# Patient Record
Sex: MALE
[De-identification: names, ages, dates, MRNs, and addresses within clinical notes are randomized per-mention and may not be internally consistent; named-entity substitution may affect disease eponyms.]

---

## 2018-03-22 PROBLEM — Z00.00 ENCOUNTER FOR PREVENTIVE HEALTH EXAMINATION: Status: ACTIVE | Noted: 2018-03-22

## 2018-04-19 ENCOUNTER — FORM ENCOUNTER (OUTPATIENT)
Age: 63
End: 2018-04-19

## 2018-04-20 ENCOUNTER — APPOINTMENT (OUTPATIENT)
Dept: ORTHOPEDIC SURGERY | Facility: CLINIC | Age: 63
End: 2018-04-20

## 2018-04-20 ENCOUNTER — OUTPATIENT (OUTPATIENT)
Dept: OUTPATIENT SERVICES | Facility: HOSPITAL | Age: 63
LOS: 1 days | End: 2018-04-20
Payer: COMMERCIAL

## 2018-04-20 ENCOUNTER — APPOINTMENT (OUTPATIENT)
Dept: ORTHOPEDIC SURGERY | Facility: CLINIC | Age: 63
End: 2018-04-20
Payer: COMMERCIAL

## 2018-04-20 VITALS — SYSTOLIC BLOOD PRESSURE: 142 MMHG | DIASTOLIC BLOOD PRESSURE: 88 MMHG

## 2018-04-20 VITALS — WEIGHT: 188 LBS | HEIGHT: 69 IN | BODY MASS INDEX: 27.85 KG/M2

## 2018-04-20 DIAGNOSIS — Z78.9 OTHER SPECIFIED HEALTH STATUS: ICD-10-CM

## 2018-04-20 DIAGNOSIS — M75.42 IMPINGEMENT SYNDROME OF LEFT SHOULDER: ICD-10-CM

## 2018-04-20 PROCEDURE — 73030 X-RAY EXAM OF SHOULDER: CPT

## 2018-04-20 PROCEDURE — 73030 X-RAY EXAM OF SHOULDER: CPT | Mod: 26,LT

## 2018-04-20 PROCEDURE — 99203 OFFICE O/P NEW LOW 30 MIN: CPT

## 2018-04-20 RX ORDER — PREDNISONE 10 MG/1
10 TABLET ORAL
Qty: 65 | Refills: 0 | Status: ACTIVE | COMMUNITY
Start: 2018-01-09

## 2018-04-20 RX ORDER — HYDROCODONE BITARTRATE AND HOMATROPINE METHYLBROMIDE 5; 1.5 MG/5ML; MG/5ML
5-1.5 SOLUTION ORAL
Qty: 120 | Refills: 0 | Status: ACTIVE | COMMUNITY
Start: 2018-01-09

## 2018-04-20 RX ORDER — ALBUTEROL SULFATE 90 UG/1
108 (90 BASE) AEROSOL, METERED RESPIRATORY (INHALATION)
Qty: 8 | Refills: 0 | Status: ACTIVE | COMMUNITY
Start: 2018-02-22

## 2018-04-20 RX ORDER — ROSUVASTATIN CALCIUM 10 MG/1
10 TABLET, FILM COATED ORAL
Refills: 0 | Status: ACTIVE | COMMUNITY

## 2018-04-20 RX ORDER — MUPIROCIN 20 MG/G
2 OINTMENT TOPICAL
Qty: 44 | Refills: 0 | Status: ACTIVE | COMMUNITY
Start: 2017-12-21

## 2018-04-20 RX ORDER — ROSUVASTATIN CALCIUM 10 MG/1
10 TABLET, FILM COATED ORAL
Qty: 30 | Refills: 0 | Status: ACTIVE | COMMUNITY
Start: 2017-11-03

## 2018-04-20 RX ORDER — LEVOCETIRIZINE DIHYDROCHLORIDE 5 MG/1
5 TABLET ORAL
Qty: 90 | Refills: 0 | Status: ACTIVE | COMMUNITY
Start: 2017-12-21

## 2018-04-20 RX ORDER — AZITHROMYCIN 250 MG/1
250 TABLET, FILM COATED ORAL
Qty: 6 | Refills: 0 | Status: ACTIVE | COMMUNITY
Start: 2018-01-14

## 2018-04-20 RX ORDER — FLUTICASONE PROPIONATE 50 UG/1
50 SPRAY, METERED NASAL
Qty: 16 | Refills: 0 | Status: ACTIVE | COMMUNITY
Start: 2018-01-09

## 2018-04-20 RX ORDER — MOMETASONE FUROATE AND FORMOTEROL FUMARATE DIHYDRATE 200; 5 UG/1; UG/1
200-5 AEROSOL RESPIRATORY (INHALATION)
Qty: 13 | Refills: 0 | Status: ACTIVE | COMMUNITY
Start: 2018-01-09

## 2018-04-20 RX ORDER — FAMOTIDINE 20 MG/1
20 TABLET, FILM COATED ORAL
Qty: 30 | Refills: 0 | Status: ACTIVE | COMMUNITY
Start: 2018-01-09

## 2018-04-20 RX ORDER — CEPHALEXIN 250 MG/1
250 CAPSULE ORAL
Qty: 40 | Refills: 0 | Status: ACTIVE | COMMUNITY
Start: 2017-12-21

## 2018-04-20 RX ORDER — METHYLPREDNISOLONE 4 MG/1
4 TABLET ORAL
Qty: 21 | Refills: 0 | Status: ACTIVE | COMMUNITY
Start: 2018-01-07

## 2018-04-20 RX ORDER — BENZONATATE 200 MG/1
200 CAPSULE ORAL
Qty: 42 | Refills: 0 | Status: ACTIVE | COMMUNITY
Start: 2018-02-22

## 2018-04-20 RX ORDER — FLUTICASONE FUROATE AND VILANTEROL TRIFENATATE 200; 25 UG/1; UG/1
200-25 POWDER RESPIRATORY (INHALATION)
Qty: 60 | Refills: 0 | Status: ACTIVE | COMMUNITY
Start: 2018-01-29

## 2018-04-20 RX ORDER — BUPROPION HYDROCHLORIDE 150 MG/1
150 TABLET, EXTENDED RELEASE ORAL
Qty: 30 | Refills: 0 | Status: ACTIVE | COMMUNITY
Start: 2017-11-14

## 2018-04-20 RX ORDER — LISDEXAMFETAMINE DIMESYLATE 50 MG/1
50 CAPSULE ORAL
Qty: 30 | Refills: 0 | Status: ACTIVE | COMMUNITY
Start: 2018-01-12

## 2018-04-20 RX ORDER — DEXLANSOPRAZOLE 60 MG/1
60 CAPSULE, DELAYED RELEASE ORAL
Qty: 30 | Refills: 0 | Status: ACTIVE | COMMUNITY
Start: 2018-02-21

## 2018-04-20 RX ORDER — INHALER, ASSIST DEVICES
SPACER (EA) MISCELLANEOUS
Qty: 1 | Refills: 0 | Status: ACTIVE | COMMUNITY
Start: 2018-01-17

## 2018-04-20 RX ORDER — MOMETASONE FUROATE AND FORMOTEROL FUMARATE DIHYDRATE 100; 5 UG/1; UG/1
100-5 AEROSOL RESPIRATORY (INHALATION)
Qty: 13 | Refills: 0 | Status: ACTIVE | COMMUNITY
Start: 2018-01-02

## 2018-04-20 RX ORDER — OSELTAMIVIR PHOSPHATE 75 MG/1
75 CAPSULE ORAL
Qty: 10 | Refills: 0 | Status: ACTIVE | COMMUNITY
Start: 2018-02-22

## 2018-04-20 RX ORDER — ADALIMUMAB 40MG/0.8ML
40 KIT SUBCUTANEOUS
Qty: 2 | Refills: 0 | Status: ACTIVE | COMMUNITY
Start: 2017-07-14

## 2018-08-11 ENCOUNTER — HOSPITAL ENCOUNTER (OUTPATIENT)
Dept: HOSPITAL 31 - C.ER | Age: 63
LOS: 1 days | Discharge: HOME | End: 2018-08-12
Attending: UROLOGY
Payer: COMMERCIAL

## 2018-08-11 VITALS — BODY MASS INDEX: 23.9 KG/M2

## 2018-08-11 DIAGNOSIS — N32.89: ICD-10-CM

## 2018-08-11 DIAGNOSIS — R33.8: ICD-10-CM

## 2018-08-11 DIAGNOSIS — N21.0: ICD-10-CM

## 2018-08-11 DIAGNOSIS — N32.3: ICD-10-CM

## 2018-08-11 DIAGNOSIS — N40.1: ICD-10-CM

## 2018-08-11 DIAGNOSIS — C67.9: Primary | ICD-10-CM

## 2018-08-11 LAB
ALBUMIN SERPL-MCNC: 4.1 G/DL (ref 3.5–5)
ALBUMIN/GLOB SERPL: 1.7 {RATIO} (ref 1–2.1)
ALT SERPL-CCNC: 40 U/L (ref 21–72)
AST SERPL-CCNC: 31 U/L (ref 17–59)
BASOPHILS # BLD AUTO: 0 K/UL (ref 0–0.2)
BASOPHILS NFR BLD: 0.4 % (ref 0–2)
BILIRUB UR-MCNC: NEGATIVE MG/DL
BUN SERPL-MCNC: 14 MG/DL (ref 9–20)
CALCIUM SERPL-MCNC: 9.1 MG/DL (ref 8.6–10.4)
COLOR UR: (no result)
EOSINOPHIL # BLD AUTO: 0.2 K/UL (ref 0–0.7)
EOSINOPHIL NFR BLD: 3.6 % (ref 0–4)
ERYTHROCYTE [DISTWIDTH] IN BLOOD BY AUTOMATED COUNT: 13.1 % (ref 11.5–14.5)
GFR NON-AFRICAN AMERICAN: > 60
GLUCOSE UR STRIP-MCNC: NORMAL MG/DL
HGB BLD-MCNC: 15.8 G/DL (ref 12–18)
LEUKOCYTE ESTERASE UR-ACNC: (no result) LEU/UL
LYMPHOCYTES # BLD AUTO: 1.1 K/UL (ref 1–4.3)
LYMPHOCYTES NFR BLD AUTO: 17.9 % (ref 20–40)
MCH RBC QN AUTO: 31.4 PG (ref 27–31)
MCHC RBC AUTO-ENTMCNC: 34.9 G/DL (ref 33–37)
MCV RBC AUTO: 90 FL (ref 80–94)
MONOCYTES # BLD: 0.4 K/UL (ref 0–0.8)
MONOCYTES NFR BLD: 6.5 % (ref 0–10)
NEUTROPHILS # BLD: 4.6 K/UL (ref 1.8–7)
NEUTROPHILS NFR BLD AUTO: 71.6 % (ref 50–75)
NRBC BLD AUTO-RTO: 0.1 % (ref 0–2)
PH UR STRIP: 6 [PH] (ref 5–8)
PLATELET # BLD: 327 K/UL (ref 130–400)
PMV BLD AUTO: 8.3 FL (ref 7.2–11.7)
PROT UR STRIP-MCNC: (no result) MG/DL
RBC # BLD AUTO: 5.02 MIL/UL (ref 4.4–5.9)
RBC # UR STRIP: (no result) /UL
SP GR UR STRIP: 1.02 (ref 1–1.03)
UROBILINOGEN UR-MCNC: NORMAL MG/DL (ref 0.2–1)
WBC # BLD AUTO: 6.4 K/UL (ref 4.8–10.8)

## 2018-08-11 PROCEDURE — 93005 ELECTROCARDIOGRAM TRACING: CPT

## 2018-08-11 PROCEDURE — 74176 CT ABD & PELVIS W/O CONTRAST: CPT

## 2018-08-11 PROCEDURE — 99285 EMERGENCY DEPT VISIT HI MDM: CPT

## 2018-08-11 PROCEDURE — 84153 ASSAY OF PSA TOTAL: CPT

## 2018-08-11 PROCEDURE — 87086 URINE CULTURE/COLONY COUNT: CPT

## 2018-08-11 PROCEDURE — 81001 URINALYSIS AUTO W/SCOPE: CPT

## 2018-08-11 PROCEDURE — 88104 CYTOPATH FL NONGYN SMEARS: CPT

## 2018-08-11 PROCEDURE — 52204 CYSTOSCOPY W/BIOPSY(S): CPT

## 2018-08-11 PROCEDURE — 52234 CYSTOSCOPY AND TREATMENT: CPT

## 2018-08-11 PROCEDURE — 52500 TRURL RESECTION BLADDER NECK: CPT

## 2018-08-11 PROCEDURE — 80053 COMPREHEN METABOLIC PANEL: CPT

## 2018-08-11 PROCEDURE — 74022 RADEX COMPL AQT ABD SERIES: CPT

## 2018-08-11 PROCEDURE — 88305 TISSUE EXAM BY PATHOLOGIST: CPT

## 2018-08-11 PROCEDURE — 85025 COMPLETE CBC W/AUTO DIFF WBC: CPT

## 2018-08-11 PROCEDURE — 96360 HYDRATION IV INFUSION INIT: CPT

## 2018-08-11 PROCEDURE — 74177 CT ABD & PELVIS W/CONTRAST: CPT

## 2018-08-11 PROCEDURE — 84154 ASSAY OF PSA FREE: CPT

## 2018-08-11 NOTE — C.PDOC
History Of Present Illness


63 y/o male, with past medical history of asthma, sent to ED by Dr. Ne Connolly for evaluation of blood in his urine since last night around 01:30. He 

states he woke up last night, had difficulty urinating, and felt something 

traveling down his urethra, and noticed blood when he looked down. He also 

reports brown discharge in his urine. Otherwise, denies abdominal pain, nausea, 

vomiting, back pain, fever, or any other complaints at  this time.





Time Seen by Provider: 08/11/18 09:29


Chief Complaint (Nursing): Male Genitourinary


History Per: Patient


History/Exam Limitations: no limitations


Onset/Duration Of Symptoms: Days


Current Symptoms Are (Timing): Still Present


Quality Of Discomfort: "Pain"


Associated Symptoms: Urinary Symptoms





Past Medical History


Reviewed: Historical Data, Nursing Documentation, Vital Signs


Vital Signs: 


 Last Vital Signs











Temp  99 F   08/11/18 13:25


 


Pulse  70   08/11/18 13:25


 


Resp  16   08/11/18 13:25


 


BP  111/65   08/11/18 13:25


 


Pulse Ox  98   08/11/18 13:31














- Medical History


PMH: Asthma


Family History: States: Unknown Family Hx





- Social History


Hx Alcohol Use: Yes


Hx Substance Use: No





- Immunization History


Hx Tetanus Toxoid Vaccination: No


Hx Influenza Vaccination: Yes


Hx Pneumococcal Vaccination: Yes





Review Of Systems


Except As Marked, All Systems Reviewed And Found Negative.


Constitutional: Negative for: Fever, Chills


Cardiovascular: Negative for: Chest Pain, Palpitations


Respiratory: Negative for: Shortness of Breath


Gastrointestinal: Negative for: Nausea, Vomiting, Abdominal Pain


Genitourinary: Positive for: Hematuria


Musculoskeletal: Negative for: Back Pain





Physical Exam





- Physical Exam


Appears: Non-toxic, No Acute Distress


Skin: Normal Color, Warm, Dry


Head: Atraumatic, Normacephalic


Eye(s): bilateral: Normal Inspection


Oral Mucosa: Moist


Cardiovascular: Rhythm Regular


Respiratory: Normal Breath Sounds, No Rales, No Rhonchi, No Wheezing


Gastrointestinal/Abdominal: Soft, Tenderness (mild suprapubic), No Guarding, No 

Rebound


Back: No CVA Tenderness


Extremity: Normal ROM


Neurological/Psych: Oriented x3, Normal Speech





ED Course And Treatment





- Laboratory Results


Result Diagrams: 


 08/11/18 10:06





 08/11/18 10:06


O2 Sat by Pulse Oximetry: 98 (RA)


Pulse Ox Interpretation: Normal





- CT Scan/US


  ** Abd & Pelvis CT


Other Rad Studies (CT/US): Read By Radiologist, Radiology Report Reviewed


CT/US Interpretation: Date of service:  08/11/2018.  PROCEDURE:  CT Abdomen and 

Pelvis without intravenous contrast.  HISTORY:  abd pain.  COMPARISON:  None.  

TECHNIQUE:  Axial and reformatted coronal and sagittal CT images of the abdomen 

and pelvis were obtained without IV or oral contrast administration..  Contrast 

dose: 0.  Radiation dose:  Total exam DLP = 587.15 mGy-cm.  This CT exam was 

performed using one or more of the following dose reduction techniques: 

Automated exposure control, adjustment of the mA and/or kV according to patient 

size, and/or use of iterative reconstruction technique.  FINDINGS:  LOWER THORAX

:  Unremarkable.  LIVER:  Slightly heterogeneous attenuation of the liver is 

noted without evidence of discrete mass lesion.  GALLBLADDER AND BILE DUCTS:  

Unremarkable.  PANCREAS:  Unremarkable. No gross lesion or ductal dilatation.  

SPLEEN:  Unremarkable.  ADRENALS:  Unremarkable. No mass.  KIDNEYS AND URETERS:

  Unremarkable. No hydronephrosis. No solid mass.  VASCULATURE:  Unremarkable. 

No aortic aneurysm.  BOWEL:  Colonic diverticulosis are noted without evidence 

of diverticulitis. There is no evidence of small bowel obstruction.  APPENDIX:  

Unremarkable. Normal appendix.  PERITONEUM:  Unremarkable. No free fluid. No 

free air.  LYMPH NODES:  Unremarkable. No enlarged lymph nodes.  BLADDER:  The 

urinary bladder is mildly to moderately distended displaced anteriorly.  There 

is high attenuation structure at the midline posterior lower portion of the 

bladder measures approximately 2 centimeter in the transverse diameter and 2.1 

centimeter in the AP diameter of uncertain etiology.  REPRODUCTIVE:  There is 

large cystic structure posterior to the urinary bladder and superior to the 

prostate to the left of the midline measures 5.7 centimeter in the transverse 

diameter and 8.2 centimeter in the longitudinal diameter suspicious for left 

seminal vesicle large cyst.  The prostate is heterogeneous mildly enlarged.  

There is linear shaped calcification noted at the midline prostate.  BONES:  

There are scattered small sclerotic bony lesions noted in the pelvic bones of 

uncertain etiology may represent multiple bone islands.  OTHER FINDINGS:  None.

  IMPRESSION:  No evidence of nephrolithiasis or hydronephrosis.  Large cystic 

lesion noted posterior to the urinary bladder and anterior superior to the 

prostate to the left of the midline suspicious for large left seminal vesicles 

cyst.  Focal high attenuation noted at the midline urinary bladder base of 

uncertain etiology. The possibility of bladder neoplasm should be excluded. The 

differential considerations includes congenital anomaly in the left seminal 

vesicles and base of the urinary bladder.  Further assessment by enhanced CT or 

ultrasound is recommended.  Distended urinary bladder displaced anteriorly by 

above-mentioned cystic lesion.  Colonic diverticulosis without evidence of 

diverticulitis.  No CT evidence of cholecystitis pancreatitis or appendicitis.





Medical Decision Making


Medical Decision Making: 


Plan:


Blood work


Urinalysis


Abd & Pelvis CT


EKG


Rocephin, IV fluids








Disposition


Discussed With Dr.: Ne Connolly


Counseled Patient/Family Regarding: Studies Performed, Diagnosis, Need For 

Followup, Rx Given





- Disposition


Referrals: 


Ne Connolly MD [Staff Provider] - 


Disposition: HOSPITALIZED


Disposition Time: 13:25


Condition: STABLE


Prescriptions: 


Levofloxacin [Levaquin] 500 mg PO DAILY #10 tablet


Tamsulosin [Flomax] 0.4 mg PO BID #10 cap


Instructions:  Blood in the Urine (Hematuria) in Adults


Forms:  [employer] (English)





- POA


Present On Arrival: None





- Clinical Impression


Clinical Impression: 


 Hematuria








- Scribe Statement


The provider has reviewed the documentation as recorded by the Scribe





KP





All medical record entries made by the Scribe were at my direction and 

personally dictated by me. I have reviewed the chart and agree that the record 

accurately reflects my personal performance of the history, physical exam, 

medical decision making, and the department course for this patient. I have 

also personally directed, reviewed, and agree with the discharge instructions 

and disposition.





Decision To Admit





- Pt Status Changed To:


Hospital Disposition Of: [facility]- Endo,OR,Cath,IR





- .


Bed Request Type: Same Day Surgery


Admitting Physician: Ne Connolly


Patient Diagnosis: 


 Hematuria

## 2018-08-11 NOTE — CT
Date of service: 



08/11/2018



PROCEDURE:  CT Abdomen and Pelvis with contrast



HISTORY:

cistic structure on non con CT



COMPARISON:

Comparison is made with the previous noncontrast same-day CT of the 

abdomen and pelvis.



TECHNIQUE:

Contrast dose: 100 mL Visipaque 320.



Axial and reformatted coronal and sagittal CT images of the abdomen 

and pelvis were obtained after IV contrast administration.



Radiation dose:



Total exam DLP = 638.29 mGy-cm.



This CT exam was performed using one or more of the following dose 

reduction techniques: Automated exposure control, adjustment of the 

mA and/or kV according to patient size, and/or use of iterative 

reconstruction technique.



FINDINGS:



LOWER THORAX:

Unremarkable. 



LIVER:

Unremarkable. No gross lesion or ductal dilatation. 



GALLBLADDER AND BILE DUCTS:

Unremarkable. 



PANCREAS:

Unremarkable. No gross lesion or ductal dilatation.



SPLEEN:

Unremarkable. 



ADRENALS:

Unremarkable. No mass. 



KIDNEYS AND URETERS:

Unremarkable. No hydronephrosis. No solid mass. 



VASCULATURE:

Unremarkable. No aortic aneurysm. 



BOWEL:

Colonic diverticulosis are again noted without evidence of 

diverticulitis. . No obstruction. No gross mural thickening. 



APPENDIX:

No evidence of appendicitis. 



PERITONEUM:

Unremarkable. No free fluid. No free air. 



LYMPH NODES:

Unremarkable. No enlarged lymph nodes. 



BLADDER:

There is enhancing nodule at the posterior aspect of the urinary 

bladder slightly to the right of the midline measures 2.1 centimeter 

in the transverse diameter and 1.8 centimeter in the AP diameter 

suspicious for bladder neoplasm. There is also high attenuation 

nodule protruding from the base of the urinary bladder may represent 

bladder neoplasm or prostate neoplasm protruding into the bladder 

lumen. 



REPRODUCTIVE:

Again noted is large cystic lesion posterior to the bladder and 

anterior superior to the prostate likely represent large seminal 

vesicle cyst or large bladder diverticulum. The prostate is mildly 

enlarged and heterogeneous. 



BONES:

No acute fracture. 



OTHER FINDINGS:

None.



IMPRESSION:

Enhancing nodule at the right posterior aspect of the bladder 

suspicious for bladder neoplasm.  Further assessment is recommended.



High attenuation nodule protruding at the base of the bladder may 

represent bladder nodule or prostate tumor protruding into the 

bladder lumen.



Large cystic structure to the left and posterior of the bladder may 

represent large seminal vesicle cyst or less likely large bladder 

diverticulum.



No evidence of lymphadenopathy in the pelvis.

## 2018-08-11 NOTE — PCM.SURG1
Surgeon's Initial Post Op Note





- Surgeon's Notes


Surgeon: JESUS Connolly


Assistant: none


Type of Anesthesia: General LMA


Pre-Operative Diagnosis: Hematuria.  BPH


Operative Findings: same, bladder tumor,.  Bladder diverticulum


Post-Operative Diagnosis: same


Operation Performed: cysto, evacuation of bladder clots.  TUR-BT.  Bladder bx 

and fulg.  TUR-Bx of BN


Specimen/Specimens Removed: urine, bladder tumor, bladder bx, BN


Estimated Blood Loss: EBL {In ML}: 0


Blood Products Given: N/A


Date of Surgery/Procedure: 08/11/18


Time of Surgery/Procedure: 15:35

## 2018-08-11 NOTE — CP.PCM.CON
Past Patient History





- Past Social History


Smoking Status: Never Smoked





- PULMONARY


Hx Asthma: Yes





- INTEGUMENTARY


Hx Psoriasis: Yes





- PSYCHIATRIC


Hx Substance Use: No





- SURGICAL HISTORY


Other/Comment: Lt. wrist surgery





- ANESTHESIA


Hx Anesthesia: No


Hx Anesthesia Reactions: No





Meds


Home Medications: 


 Home Medication List











 Medication  Instructions  Recorded  Confirmed  Type


 


Levofloxacin [Levaquin] 500 mg PO DAILY #10 tablet 08/11/18  Rx


 


Tamsulosin [Flomax] 0.4 mg PO BID #10 cap 08/11/18  Rx











Allergies/Adverse Reactions: 


 Allergies











Allergy/AdvReac Type Severity Reaction Status Date / Time


 


latex Allergy  SHORTNESS Verified 08/11/18 09:20





   OF BREATH  














Results





- Vital Signs


Recent Vital Signs: 


 Last Vital Signs











Temp  99 F   08/11/18 13:25


 


Pulse  70   08/11/18 13:25


 


Resp  16   08/11/18 13:25


 


BP  111/65   08/11/18 13:25


 


Pulse Ox  98   08/11/18 13:43














- Labs


Result Diagrams: 


 08/11/18 10:06





 08/11/18 10:06


Labs: 


 Laboratory Results - last 24 hr











  08/11/18 08/11/18 08/11/18





  10:06 10:06 10:06


 


WBC  6.4  


 


RBC  5.02  


 


Hgb  15.8  


 


Hct  45.2  


 


MCV  90.0  


 


MCH  31.4 H  


 


MCHC  34.9  


 


RDW  13.1  


 


Plt Count  327  


 


MPV  8.3  


 


Neut % (Auto)  71.6  


 


Lymph % (Auto)  17.9 L  


 


Mono % (Auto)  6.5  


 


Eos % (Auto)  3.6  


 


Baso % (Auto)  0.4  


 


Neut # (Auto)  4.6  


 


Lymph # (Auto)  1.1  


 


Mono # (Auto)  0.4  


 


Eos # (Auto)  0.2  


 


Baso # (Auto)  0.0  


 


Sodium    141


 


Potassium    4.2


 


Chloride    106


 


Carbon Dioxide    29


 


Anion Gap    11


 


BUN    14


 


Creatinine    0.8


 


Est GFR ( Amer)    > 60


 


Est GFR (Non-Af Amer)    > 60


 


Random Glucose    110


 


Calcium    9.1


 


Total Bilirubin    0.8


 


AST    31


 


ALT    40


 


Alkaline Phosphatase    65


 


Total Protein    6.6


 


Albumin    4.1


 


Globulin    2.5


 


Albumin/Globulin Ratio    1.7


 


Urine Color   Dark red 


 


Urine Clarity   Hazy 


 


Urine pH   6.0 


 


Ur Specific Gravity   1.024 


 


Urine Protein   2+ H 


 


Urine Glucose (UA)   Normal 


 


Urine Ketones   Negative 


 


Urine Blood   2+ H 


 


Urine Nitrate   Negative 


 


Urine Bilirubin   Negative 


 


Urine Urobilinogen   Normal 


 


Ur Leukocyte Esterase   Neg 


 


Urine WBC (Auto)   6 H 


 


Urine RBC (Auto)   2943 H 


 


Urine Yeast (Budding)   Few H 














Assessment & Plan





- Assessment and Plan (Free Text)


Assessment: 





IMP:


Hematuria


BPH


Incomplete emptying


Asthma, Psoriasis





full note t/f





YS





- Date & Time


Date: 08/11/18


Time: 12:30

## 2018-08-11 NOTE — CT
Date of service: 



08/11/2018



PROCEDURE:  CT Abdomen and Pelvis without intravenous contrast



HISTORY:

abd pain



COMPARISON:

None.



TECHNIQUE:

Axial and reformatted coronal and sagittal CT images of the abdomen 

and pelvis were obtained without IV or oral contrast administration.. 



Contrast dose: 0



Radiation dose:



Total exam DLP = 587.15 mGy-cm.



This CT exam was performed using one or more of the following dose 

reduction techniques: Automated exposure control, adjustment of the 

mA and/or kV according to patient size, and/or use of iterative 

reconstruction technique.



FINDINGS:



LOWER THORAX:

Unremarkable. 



LIVER:

Slightly heterogeneous attenuation of the liver is noted without 

evidence of discrete mass lesion.  



GALLBLADDER AND BILE DUCTS:

Unremarkable. 



PANCREAS:

Unremarkable. No gross lesion or ductal dilatation.



SPLEEN:

Unremarkable. 



ADRENALS:

Unremarkable. No mass. 



KIDNEYS AND URETERS:

Unremarkable. No hydronephrosis. No solid mass. 



VASCULATURE:

Unremarkable. No aortic aneurysm. 



BOWEL:

Colonic diverticulosis are noted without evidence of diverticulitis. 

There is no evidence of small bowel obstruction.



APPENDIX:

Unremarkable. Normal appendix. 



PERITONEUM:

Unremarkable. No free fluid. No free air. 



LYMPH NODES:

Unremarkable. No enlarged lymph nodes. 



BLADDER:

The urinary bladder is mildly to moderately distended displaced 

anteriorly.  There is high attenuation structure at the midline 

posterior lower portion of the bladder measures approximately 2 

centimeter in the transverse diameter and 2.1 centimeter in the AP 

diameter of uncertain etiology. 



REPRODUCTIVE:

There is large cystic structure posterior to the urinary bladder and 

superior to the prostate to the left of the midline measures 5.7 

centimeter in the transverse diameter and 8.2 centimeter in the 

longitudinal diameter suspicious for left seminal vesicle large cyst.



The prostate is heterogeneous mildly enlarged.  There is linear 

shaped calcification noted at the midline prostate. 



BONES:

There are scattered small sclerotic bony lesions noted in the pelvic 

bones of uncertain etiology may represent multiple bone islands. 



OTHER FINDINGS:

None.



IMPRESSION:

No evidence of nephrolithiasis or hydronephrosis.



Large cystic lesion noted posterior to the urinary bladder and 

anterior superior to the prostate to the left of the midline 

suspicious for large left seminal vesicles cyst.



Focal high attenuation noted at the midline urinary bladder base of 

uncertain etiology. The possibility of bladder neoplasm should be 

excluded. The differential considerations includes congenital anomaly 

in the left seminal vesicles and base of the urinary bladder.  

Further assessment by enhanced CT or ultrasound is recommended.



Distended urinary bladder displaced anteriorly by above-mentioned 

cystic lesion.



Colonic diverticulosis without evidence of diverticulitis.



No CT evidence of cholecystitis pancreatitis or appendicitis.

## 2018-08-12 VITALS
OXYGEN SATURATION: 98 % | DIASTOLIC BLOOD PRESSURE: 74 MMHG | HEART RATE: 80 BPM | TEMPERATURE: 98.5 F | SYSTOLIC BLOOD PRESSURE: 132 MMHG

## 2018-08-12 VITALS — RESPIRATION RATE: 20 BRPM

## 2018-08-13 LAB — PSA SERPL-MCNC: 0.5 NG/ML

## 2018-08-13 NOTE — CON
Copied To:  Ne Connolly MD

Attending MD:  Ne Connolly MD



DATE:  08/12/2018



UROLOGY CONSULTATION



Urology consultation provided by Dr. Ne Connolly.



REASON FOR CONSULTATION:  Hematuria.  Abdominal pain.



HISTORY OF PRESENT ILLNESS:  The patient is a 62-year-old male with

hematuria.



The patient presents with hematuria since last night.



The patient voids with fair urinary stream.  He noticed slowing of the

urinary stream.  He was able to urinate only small amount.  There was an

interrupted stream.  Thereafter, he was unable to urinate.  The patient

passed first bright red urine _____ small amounts of dark brown urine.



There has been no fever.  The patient has lower abdominal pain.  No flank

pain.  No nausea or vomiting.



There is no history of previous urolithiasis.  No history of previous

urinary tract infection.



The patient has nocturia previously, one to two times per night.



No history of hypertension, diabetes, or pneumonia.  No history of TB. 

There is a history of asthma.



PAST SURGICAL HISTORY:  The patient had previous right wrist surgery.



SOCIAL HISTORY:  The patient does not smoke.  He drinks occasional wine. 

The patient lives with his wife.  He has four children.  He is employed as

an ophthalmologist.



PHYSICAL EXAMINATION:

GENERAL:  The patient is a well-developed, well-nourished middle-aged male,

appearing his stated age.  The patient is awake and alert.

VITAL SIGNS:  Stable.

ABDOMEN:  Soft.  Mild lower abdominal tenderness.  There is dullness to

percussion, in the area of the bladder.

GENITOURINARY:  Genitalia without inflammation.  Normal circumcised male. 

Scrotal contents without inflammation.

RECTAL EXAMINATION:  Normal sphincter tone.  Prostate is enlarged. 

Prostate is approximately 30 g in size, without fixation, induration, or

nodularity.  Prostate is smooth.



IMPRESSION:  Hematuria.  Voiding dysfunction.  Poor urinary stream. 

Incomplete emptying.



I performed a bladder scan.  The residual previously was approximately 400

mL.  The repeat residual was 300 mL.



The patient thereafter voided again with a poor stream.



The incomplete bladder emptying and voiding symptoms may be due to bladder

outlet obstruction.



Hematuria maybe due to infection, inflammation, urolithiasis, and/or

neoplasia.



RECOMMENDATIONS AND PLAN:  Urine culture.  Urine cytology.  Serum PSA.  CT

scan.  Cystoscopy.  Further therapy according to the patient's clinical

course.



Case reviewed with the patient and his family.  Case reviewed with the ER

attending staff.



Thank you for recommending the patient for urology consultation.





__________________________________________

Ne Connolly MD





DD:  08/12/2018 23:16:22

DT:  08/12/2018 23:19:56

Job # [id number]

## 2018-08-13 NOTE — RAD
Date of service: 



08/11/2018



HISTORY:

HEMATURIA  



COMPARISON:

No prior.



FINDINGS:



BOWEL:

Normal abdominal bowel gas pattern. Excreted contrast material within 

the urinary collecting system bilaterally. Large bladder diverticulum 

enhancing with excreted contrast material, as demonstrated on the CT 

examination from earlier on same day.  Probable 2nd small bladder 

diverticulum as well. This is demonstrated on what is labeled last 

film, following presumed urinary voiding.



BONES:

Normal.



OTHER FINDINGS:

None.



IMPRESSION:

Large and small bladder diverticula E.  Normal bowel gas pattern

## 2018-08-13 NOTE — OP
Copied To:  Ne Connolly MD

Attending MD:  Ne Connolly MD



PROCEDURE DATE:  08/11/2018



PREOPERATIVE DIAGNOSES:  Hematuria.  Urinary retention.



POSTOPERATIVE DIAGNOSES:  Hematuria.  Urinary retention.  Prostatic

enlargement.  Bladder calculi.  Bladder diverticulum.  Bladder tumor.



PROCEDURES:  Cystoscopy.  Removal of two tiny stones from the bladder. 

Transurethral resection of bladder tumor.  Bladder biopsies and

fulguration.  Transurethral resection biopsy of bladder neck.  Evacuation

of bladder clots.  Exam under anesthesia.



OPERATING SURGEON:  Ne Connolly MD



PROCEDURE AS FOLLOWS:  The patient was placed in the lithotomy position. 

Genitalia prepped and draped sterilely.  Anesthesia was provided by the

anesthesiologist.



The patient received perioperative antibiotics.



The CAT scan was reviewed.  CAT scan was performed with and without IV

contrast.  The CAT scan revealed normal upper urinary tract.  There was no

lymphadenopathy.  There was no hydronephrosis.  There was no renal tumor. 

There was a hyperdense lesion in the bladder, consistent with clots.  There

was a cystic structure on the left posterior aspect of the bladder,

consistent with bladder diverticulum.  The radiologist also suggested

possible seminal vesicle cyst.



The patient had a plain film of the abdomen performed prior to cystoscopy. 

The upper urinary tracts were well visualized with the IV contrast from the

CAT scan.  The bladder diverticulum was well filled and was noted adjacent

to the true bladder.



The patient was then placed in lithotomy position.  Genitalia prepped and

sterilely.  Anesthesia was applied by the anesthesiologist.  Perioperative

antibiotics were administered.



A 22-L[place]ian cystoscope sheath was introduced under direct vision; urethra,

prostate, and bladder were inspected.



FINDINGS:  There was no stricture in the anterior urethra.  There was mild

lateral lobe hypertrophy which was subocclusive.  There was middle lobe

hypertrophy which was occlusive at the bladder neck.  There was mild

bleeding from the prostatic urethra at the level of the middle lobe and the

bladder neck.



The bladder was inspected.  There were multiple clots within the bladder

which obscured visibility.



The bladder clots were removed using the Margarita syringe.



The bladder then was reinspected.  There was noted to be moderate bladder

trabeculation.  There was a bladder diverticulum arising from the left

posterior lateral aspect of the bladder.  The bladder diverticulum was

entered both with 30-degree and 70-degree lenses.  There was a small clot

within the bladder diverticulum which was removed.  There was  no mucosal

lesion or stone within the bladder diverticulum.



There were two tiny stones within the bladder which were irrigated free as

well.  Two tiny stones, which were irrigated free were less than or equal

to 1 mm in size.  The stones were irrigated free, although not retrieved.



Further inspection of the bladder revealed a papillary bladder tumor.  The

tumor was approximately 1 cm in size and involved the right floor of the

bladder, lateral and posterior to the right ureteral orifice.  In addition,

adjacent to this papillary bladder tumor, there was a slight erythematous

and raised mucosa.



The papillary bladder tumor was also noted to have mild encrustation on its

surface.



The bladder was then reinspected with 70-degree lens and confirmed the

above findings.  The anterior and lateral walls of the bladder were normal.

There were no other tumors identified.



The areas of the abnormal mucosa adjacent to the bladder tumor, both medial

and lateral to the bladder tumor were biopsied using cold cup biopsy

forceps.



The cystoscope and sheath were removed.



A 26-Lithuanian continuous flow resectoscope sheath was introduced under direct

vision with the visual obturator.



The bladder tumor was resected fully.  The base to the bladder tumor was

resected and sent as a separate specimen for pathologic examination.



Hemostasis was achieved at all biopsy sites using the electrode and the

coagulating current.  Hemostasis was complete.



There was still mild bleeding noted at the bladder neck.  Transurethral

resections/biopsy of the bladder neck mucosa was performed.  Fulguration

was performed with Ball electrode and electrocautery.  Hemostasis was

complete.



The bladder and prostate were reinspected.  There was no further bleeding

noted.  There were no other lesions.



The ureteral orifices were intact.



The resectoscope and sheath were removed.  Ugalde catheter was inserted. 

Bladder drainage was clear.



Exam under anesthesia was performed.  There was no abnormal pelvic mass

fixation or induration.  Prostate was supple and smooth, approximately 20

to 25 g in size, without fixation, induration, or nodularity.



The patient was returned to supine position.



The patient tolerated the procedure without complication.  The patient was

transferred to recovery room in satisfactory condition.





__________________________________________

Ne Connolly MD





DD:  08/12/2018 23:24:46

DT:  08/12/2018 23:28:51

Job # [id number]

## 2024-11-01 ENCOUNTER — APPOINTMENT (OUTPATIENT)
Dept: ORTHOPEDIC SURGERY | Facility: CLINIC | Age: 69
End: 2024-11-01
Payer: MEDICARE

## 2024-11-01 VITALS — HEIGHT: 68 IN | BODY MASS INDEX: 28.49 KG/M2 | RESPIRATION RATE: 16 BRPM | WEIGHT: 188 LBS

## 2024-11-01 DIAGNOSIS — M19.031 PRIMARY OSTEOARTHRITIS, RIGHT WRIST: ICD-10-CM

## 2024-11-01 DIAGNOSIS — M19.032 PRIMARY OSTEOARTHRITIS, LEFT WRIST: ICD-10-CM

## 2024-11-01 PROCEDURE — 73110 X-RAY EXAM OF WRIST: CPT | Mod: 50

## 2024-11-01 PROCEDURE — 99203 OFFICE O/P NEW LOW 30 MIN: CPT | Mod: 25

## 2024-11-01 PROCEDURE — 20600 DRAIN/INJ JOINT/BURSA W/O US: CPT | Mod: LT
